# Patient Record
Sex: FEMALE | Race: WHITE | NOT HISPANIC OR LATINO | Employment: FULL TIME | URBAN - METROPOLITAN AREA
[De-identification: names, ages, dates, MRNs, and addresses within clinical notes are randomized per-mention and may not be internally consistent; named-entity substitution may affect disease eponyms.]

---

## 2017-07-18 ENCOUNTER — HOSPITAL ENCOUNTER (EMERGENCY)
Facility: MEDICAL CENTER | Age: 32
End: 2017-07-18
Attending: EMERGENCY MEDICINE
Payer: COMMERCIAL

## 2017-07-18 VITALS
TEMPERATURE: 99.9 F | SYSTOLIC BLOOD PRESSURE: 160 MMHG | RESPIRATION RATE: 18 BRPM | BODY MASS INDEX: 25.83 KG/M2 | WEIGHT: 170.42 LBS | DIASTOLIC BLOOD PRESSURE: 105 MMHG | OXYGEN SATURATION: 100 % | HEIGHT: 68 IN | HEART RATE: 85 BPM

## 2017-07-18 DIAGNOSIS — L02.91 ABSCESS: ICD-10-CM

## 2017-07-18 LAB
APPEARANCE UR: CLEAR
BACTERIA #/AREA URNS HPF: ABNORMAL /HPF
BILIRUB UR QL STRIP.AUTO: NEGATIVE
COLOR UR: YELLOW
CULTURE IF INDICATED INDCX: NO UA CULTURE
EPI CELLS #/AREA URNS HPF: NEGATIVE /HPF
GLUCOSE UR STRIP.AUTO-MCNC: NEGATIVE MG/DL
HYALINE CASTS #/AREA URNS LPF: ABNORMAL /LPF
KETONES UR STRIP.AUTO-MCNC: NEGATIVE MG/DL
LEUKOCYTE ESTERASE UR QL STRIP.AUTO: NEGATIVE
MICRO URNS: ABNORMAL
NITRITE UR QL STRIP.AUTO: NEGATIVE
PH UR STRIP.AUTO: 5.5 [PH]
PROT UR QL STRIP: NEGATIVE MG/DL
RBC # URNS HPF: ABNORMAL /HPF
RBC UR QL AUTO: ABNORMAL
SP GR UR STRIP.AUTO: 1
UROBILINOGEN UR STRIP.AUTO-MCNC: 0.2 MG/DL
WBC #/AREA URNS HPF: ABNORMAL /HPF

## 2017-07-18 PROCEDURE — 81001 URINALYSIS AUTO W/SCOPE: CPT

## 2017-07-18 PROCEDURE — 99284 EMERGENCY DEPT VISIT MOD MDM: CPT

## 2017-07-18 RX ORDER — OXYCODONE HYDROCHLORIDE AND ACETAMINOPHEN 5; 325 MG/1; MG/1
1-2 TABLET ORAL EVERY 4 HOURS PRN
Qty: 30 TAB | Refills: 0 | Status: ON HOLD | OUTPATIENT
Start: 2017-07-18 | End: 2017-07-20

## 2017-07-18 RX ORDER — CIPROFLOXACIN 500 MG/1
500 TABLET, FILM COATED ORAL 2 TIMES DAILY
Qty: 10 TAB | Refills: 0 | Status: SHIPPED | OUTPATIENT
Start: 2017-07-18 | End: 2018-05-14

## 2017-07-18 ASSESSMENT — PAIN SCALES - GENERAL: PAINLEVEL_OUTOF10: 5

## 2017-07-18 ASSESSMENT — LIFESTYLE VARIABLES: DO YOU DRINK ALCOHOL: NO

## 2017-07-18 NOTE — ED NOTES
Pt sent by MD for mass near urethra. Pt reports difficulty urinating. Pt in NAD. VSS. Pt reports mass x 1 month.

## 2017-07-18 NOTE — ED AVS SNAPSHOT
7/18/2017    Rosita Garsia  [address]  [facility] [address]    Dear Rosita:    [facility] wants to ensure your discharge home is safe and you or your loved ones have had all of your questions answered regarding your care after you leave the hospital.    Below is a list of resources and contact information should you have any questions regarding your hospital stay, follow-up instructions, or active medical symptoms.    Questions or Concerns Regarding… Contact   Medical Questions Related to Your Discharge  (7 days a week, 8am-5pm) Contact a Nurse Care Coordinator   [phone]   Medical Questions Not Related to Your Discharge  (24 hours a day / 7 days a week)  Contact the Nurse Health Line   [phone]    Medications or Discharge Instructions Refer to your discharge packet   or contact your [facility] Primary Care Provider   [phone]   Follow-up Appointment(s) Schedule your appointment via All[employer]ils   or contact Scheduling [phone]   Billing Review your statement via AllTrails  or contact Billing [phone]   Medical Records Review your records via AllTrails   or contact Medical Records [phone]     You may receive a telephone call within two days of discharge. This call is to make certain you understand your discharge instructions and have the opportunity to have any questions answered. You can also easily access your medical information, test results and upcoming appointments via the AllTrails free online health management tool. You can learn more and sign up at [email or web address]. For assistance setting up your AllTrails account, please call [phone].    Once again, we want to ensure your discharge home is safe and that you have a clear understanding of any next steps in your care. If you have any questions or concerns, please do not hesitate to contact us, we are here for you. Thank you for choosing [facility] for your healthcare needs.    Sincerely,    Your [facility] Healthcare Team

## 2017-07-18 NOTE — ED PROVIDER NOTES
"ED Provider Note    Scribed for Kenneth Schroeder M.D. by Miguel Galarza. 7/18/2017  12:37 PM    Primary care provider: Pcp Pt States None  Means of arrival: walk-in  History obtained from: patient  History limited by: none    CHIEF COMPLAINT  Chief Complaint   Patient presents with   • Sent by MD PERALTA  Rosita Garsia is a 31 y.o. female who presents to the Emergency Department for evaluation of a chronic worsening cyst located near her urethra, with associated tenderness in that region which she first noticed a year ago. Patient was seen at [facility] just prior to arrival at the ED for evaluation of a small cyst, which she has been able to \"squeeze stuff out of\" until recently. The physician told her to come to the ED immediately to be evaluated. She reports her cyst began small and has quadrupled in size, adding that \"It is painful and angry\".      REVIEW OF SYSTEMS  Pertinent positives include urethral tenderness. Pertinent negatives include none.  E.     PAST MEDICAL HISTORY   has a past medical history of Hypertension.    SURGICAL HISTORY  patient denies any surgical history    SOCIAL HISTORY  Social History   Substance Use Topics   • Smoking status: Never Smoker    • Smokeless tobacco: None   • Alcohol Use: Yes      Comment: \"heavily on weekends\"      History   Drug Use No       FAMILY HISTORY  History reviewed. No pertinent family history.    CURRENT MEDICATIONS  Home Medications     Reviewed by Vanda Sofia R.N. (Registered Nurse) on 07/18/17 at 1211  Med List Status: Partial    Medication Last Dose Status    lisinopril (PRINIVIL) 20 MG TABS  Active    lorazepam (ATIVAN) 1 MG TABS  Active                ALLERGIES  Allergies   Allergen Reactions   • Sulfa Drugs        PHYSICAL EXAM  VITAL SIGNS: /105 mmHg  Pulse 85  Temp(Src) 37.7 °C (99.9 °F)  Resp 18  Ht 1.727 m (5' 7.99\")  Wt 77.3 kg (170 lb 6.7 oz)  BMI 25.92 kg/m2  SpO2 100%    Constitutional: Well developed, Well " nourished, moderate distress, Non-toxic appearance.   HENT: Normocephalic, Atraumatic, Bilateral external ears normal, Oropharynx moist, No oral exudates.   Eyes: PERRLA, EOMI, Conjunctiva normal, No discharge.   Neck: No tenderness, Supple, No stridor.   Lymphatic: No lymphadenopathy noted.   Cardiovascular: Normal heart rate, Normal rhythm.   Thorax & Lungs: Clear to auscultation bilaterally, No respiratory distress, No wheezing, No crackles.   Abdomen: Soft, No tenderness, No masses, No pulsatile masses.   Skin: Warm, Dry, No erythema, No rash.   Extremities:, No edema No cyanosis.   : 1 cm mass-like legion on the posterior aspect of the urethra. Seems to be fluid filled. No erythema. There is tenderness. No above abdominal tenderness.   Musculoskeletal: No tenderness to palpation or major deformities noted.  Intact distal pulses  Neurologic: Awake, alert. Moves all extremities spontaneously.  Psychiatric: Affect normal, Judgment normal, Mood normal.     LABS  Labs Reviewed   URINALYSIS,CULTURE IF INDICATED - Abnormal; Notable for the following:     Occult Blood Moderate (*)     All other components within normal limits   URINE MICROSCOPIC (W/UA) - Abnormal; Notable for the following:     RBC 2-5 (*)     Bacteria Few (*)     All other components within normal limits   All labs reviewed by me.    COURSE & MEDICAL DECISION MAKING  Pertinent Labs & Imaging studies reviewed. (See chart for details)    12:37 PM - Patient seen and examined at bedside. Ordered Urinalysis, Urine microscope to evaluate her symptoms.     12:45 PM Performed a pelvic exam with a female nurse present.     12:54 PM Paged Urology.    12:58 PM - I discussed the patient's case and the above findings with Dr. Carnes (Urology).     2:01 PM Patient reevaluated at bedside. Discussed my consult with Urology with the patient. She is to follow up with Dr. Carnes. If she has any new or worsening symptoms, she is to return to the ED. Patient understands the  plan of care and is agreeable. She agrees to be discharged home.     Decision Making:  Patient with a periurethral cystic structure, Dr. Carnes came and evaluated it, he will likely need to take it to the operating room in the next 1-2 days, but the patient on Cipro, wrote for the patient's pain medicines, will have the patient return with any other concerns.    I reviewed prescription monitoring program for patient's narcotic use before prescribing a scheduled drug.The patient will not drink alcohol nor drive with prescribed medications. The patient will return for new or worsening symptoms and is stable at the time of discharge.    DISPOSITION:  Patient will be discharged home in stable condition.    FOLLOW UP:  [facility], Emergency Dept  [address] [address] [phone]    If symptoms worsen    Marco Carnes M.D.  [address]  [place] [address]  [phone]        OUTPATIENT MEDICATIONS:  Discharge Medication List as of 7/18/2017  2:04 PM      START taking these medications    Details   oxycodone-acetaminophen (PERCOCET) 5-325 MG Tab Take 1-2 Tabs by mouth every four hours as needed for Severe Pain for up to 7 days., Disp-30 Tab, R-0, Print Rx Paper      ciprofloxacin (CIPRO) 500 MG Tab Take 1 Tab by mouth 2 times a day., Disp-10 Tab, R-0, Print Rx Paper           FINAL IMPRESSION  1. Abscess          Miguel GORMAN (Scribe), am scribing for, and in the presence of, Kenneth Schroeder M.D..    Electronically signed by: Miguel Galarza (Evelyn), 7/18/2017    IKenneth M.D. personally performed the services described in this documentation, as scribed by Miguel Galarza in my presence, and it is both accurate and complete.    The note accurately reflects work and decisions made by me.  Kenneth Schroeder  7/18/2017  4:32 PM

## 2017-07-18 NOTE — ED NOTES
Pt sent by MD for mass near urethra, stated doc said urologist are on call today sent here for follow up. Pt reports difficulty urinating. Pt in NAD. VSS. Pt reports mass x 1year.

## 2017-07-18 NOTE — ED NOTES
Pt ambulatory with steady gait, pt verbalized understanding of poc and discharge , no questions at this time.  Family at bedside,   VSS

## 2017-07-18 NOTE — CONSULTS
UROLOGY Consult Note:    Marco Deyvi  Date & Time note created:    7/18/2017   1:49 PM     Referring MD:  Dr. Schroeder    Patient ID:   Name:             Rosita Garsia   YOB: 1985  Age:                 31 y.o.  female   MRN:               [id number]                                                             Reason for Consult:      Urethral lesion.    History of Present Illness:    Abnormal urethral growth for several years. Over the last week it has increased in size and become painful.    Review of Systems:      Constitutional: Denies fevers, Denies weight changes  Eyes: Denies changes in vision, no eye pain  Ears/Nose/Throat/Mouth: Denies nasal congestion or sore throat   Cardiovascular: no chest pain, no palpitations   Respiratory: no shortness of breath , Denies cough  Gastrointestinal/Hepatic: Denies abdominal pain, nausea, vomiting, diarrhea, constipation or GI bleeding   Genitourinary: Denies hematuria, dysuria or frequency, see cc  Musculoskeletal/Rheum: Denies  joint pain and swelling, no edema  Skin: Denies rash  Neurological: Denies headache, confusion, memory loss or focal weakness/parasthesias  Psychiatric: denies mood disorder   Endocrine: Roxana thyroid problems  Heme/Oncology/Lymph Nodes: Denies enlarged lymph nodes, denies brusing or known bleeding disorder  All other systems were reviewed and are negative (AMA/CMS criteria)                Past Medical History:   Past Medical History   Diagnosis Date   • Hypertension      There are no hospital problems to display for this patient.      Past Surgical History:  History reviewed. No pertinent past surgical history.    Hospital Medications:  No current facility-administered medications for this encounter.    Current outpatient prescriptions:   •  lisinopril (PRINIVIL) 20 MG TABS, Take 20 mg by mouth every day., Disp: , Rfl:   •  lorazepam (ATIVAN) 1 MG TABS, Take 1 mg by mouth 2 times a day., Disp: , Rfl:     Current Outpatient  "Medications:    (Not in a hospital admission)    Medication Allergy:  Allergies   Allergen Reactions   • Sulfa Drugs        Family History:  History reviewed. No pertinent family history.    Social History:  Social History     Social History   • Marital Status:      Spouse Name: N/A   • Number of Children: N/A   • Years of Education: N/A     Occupational History   • Not on file.     Social History Main Topics   • Smoking status: Never Smoker    • Smokeless tobacco: Not on file   • Alcohol Use: Yes      Comment: \"heavily on weekends\"   • Drug Use: No   • Sexual Activity: Not on file     Other Topics Concern   • Not on file     Social History Narrative         Physical Exam:  Vitals/ General Appearance:   Weight/BMI: Body mass index is 25.92 kg/(m^2).  Blood pressure 160/105, pulse 85, temperature 37.7 °C (99.9 °F), resp. rate 18, height 1.727 m (5' 7.99\"), weight 77.3 kg (170 lb 6.7 oz), SpO2 100 %.  Filed Vitals:    07/18/17 1147 07/18/17 1149   BP: 160/105    Pulse: 85    Temp: 37.7 °C (99.9 °F)    Resp: 18    Height: 1.727 m (5' 7.99\")    Weight:  77.3 kg (170 lb 6.7 oz)   SpO2: 100%      Oxygen Therapy:  Pulse Oximetry: 100 %, O2 Delivery: None (Room Air)    Constitutional:   Well developed, Well nourished, No acute distress  HENMT:  Normocephalic, Atraumatic, Oropharynx moist mucous membranes, No oral exudates, Nose normal.  No thyromegaly.  Eyes:  EOMI, Conjunctiva normal, No discharge.  Neck:  Normal range of motion, No cervical tenderness,  no JVD.  Cardiovascular:  Normal heart rate, Normal rhythm, No murmurs, No rubs, No gallops.   Extremitites with intact distal pulses, no cyanosis, or edema.  Lungs:  Normal breath sounds, breath sounds clear to auscultation bilaterally,  no crackles, no wheezing.   Abdomen: Bowel sounds normal, Soft, No tenderness, No guarding, No rebound, No masses, No hepatosplenomegaly.  : Normal external genitalia without lesion. 1 cm midline tender growth just proximal to " the urethral meatus. This does not drain with pressure.  Skin: Warm, Dry, No erythema, No rash, no induration.  Neurologic: Alert & oriented x 3, No focal deficits noted, cranial nerves II through X are grossly intact.  Psychiatric: Affect normal, Judgment normal, Mood normal.      MDM (Data Review):     Records reviewed and summarized in current documentation    Lab Data Review:  Recent Results (from the past 24 hour(s))   URINALYSIS,CULTURE IF INDICATED    Collection Time: 07/18/17  1:00 PM   Result Value Ref Range    Color Yellow     Character Clear     Specific Gravity 1.004 <1.035    Ph 5.5 5.0-8.0    Glucose Negative Negative mg/dL    Ketones Negative Negative mg/dL    Protein Negative Negative mg/dL    Bilirubin Negative Negative    Urobilinogen, Urine 0.2 Negative    Nitrite Negative Negative    Leukocyte Esterase Negative Negative    Occult Blood Moderate (A) Negative    Micro Urine Req Microscopic    URINE MICROSCOPIC (W/UA)    Collection Time: 07/18/17  1:00 PM   Result Value Ref Range    WBC 0-2 /hpf    RBC 2-5 (A) /hpf    Bacteria Few (A) None /hpf    Epithelial Cells Negative /hpf    Hyaline Cast 0-2 /lpf       Imaging/Procedures Review:    Reviewed    MDM (Assessment and Plan):   Infected periurethral cyst.   Plan: I&D, will schedule in OR.

## 2017-07-18 NOTE — DISCHARGE INSTRUCTIONS
Please follow-up with your primary care provider for blood pressure management.      Abscess  An abscess (boil or furuncle) is an infected area on or under the skin. This area is filled with yellowish-white fluid (pus) and other material (debris).  HOME CARE   · Only take medicines as told by your doctor.  · If you were given antibiotic medicine, take it as directed. Finish the medicine even if you start to feel better.  · If gauze is used, follow your doctor's directions for changing the gauze.  · To avoid spreading the infection:  ¨ Keep your abscess covered with a bandage.  ¨ Wash your hands well.  ¨ Do not share personal care items, towels, or whirlpools with others.  ¨ Avoid skin contact with others.  · Keep your skin and clothes clean around the abscess.  · Keep all doctor visits as told.  GET HELP RIGHT AWAY IF:   · You have more pain, puffiness (swelling), or redness in the wound site.  · You have more fluid or blood coming from the wound site.  · You have muscle aches, chills, or you feel sick.  · You have a fever.  MAKE SURE YOU:   · Understand these instructions.  · Will watch your condition.  · Will get help right away if you are not doing well or get worse.     This information is not intended to replace advice given to you by your health care provider. Make sure you discuss any questions you have with your health care provider.     Document Released: 06/05/2009 Document Revised: 06/18/2013 Document Reviewed: 03/02/2013  [employer] Interactive Patient Education ©2016 [employer]

## 2017-07-18 NOTE — ED AVS SNAPSHOT
[employer] Access Code: [email or web address]  Expires: 8/17/2017  2:04 PM    Your email address is not on file at [facility].  Email Addresses are required for you to sign up for [employer], please contact [phone] to verify your personal information and to provide your email address prior to attempting to register for [employer].    Rosita Garsia  [address]  [place] [address]    [employer]  A secure, online tool to manage your health information     [facility]’s [employer] Network® is a secure, online tool that connects you to your personalized health information from the privacy of your home -- day or night - making it very easy for you to manage your healthcare. Once the activation process is completed, you can even access your medical information using the [employer] adán, which is available for free in the Apple Adán store or Google Play store.     To learn more about [employer], visit [email or web address]    There are two levels of access available (as shown below):   My Chart Features  [facility] Primary Care Doctor [facility]  Specialists [facility]  Urgent  Care Non-[facility] Primary Care Doctor   Email your healthcare team securely and privately 24/7 X X X    Manage appointments: schedule your next appointment; view details of past/upcoming appointments X      Request prescription refills. X      View recent personal medical records, including lab and immunizations X X X X   View health record, including health history, allergies, medications X X X X   Read reports about your outpatient visits, procedures, consult and ER notes X X X X   See your discharge summary, which is a recap of your hospital and/or ER visit that includes your diagnosis, lab results, and care plan X X  X     How to register for [employer]:  Once your e-mail address has been verified, follow the following steps to sign up for [employer].     1. Go to  [email or web address]  2. Click on the Sign Up Now box, which takes you to the New Member Sign Up page. You  will need to provide the following information:  a. Enter your [employer] Access Code exactly as it appears at the top of this page. (You will not need to use this code after you’ve completed the sign-up process. If you do not sign up before the expiration date, you must request a new code.)   b. Enter your date of birth.   c. Enter your home email address.   d. Click Submit, and follow the next screen’s instructions.  3. Create a [employer] ID. This will be your [employer] login ID and cannot be changed, so think of one that is secure and easy to remember.  4. Create a [employer] password. You can change your password at any time.  5. Enter your Password Reset Question and Answer. This can be used at a later time if you forget your password.   6. Enter your e-mail address. This allows you to receive e-mail notifications when new information is available in [employer].  7. Click Sign Up. You can now view your health information.    For assistance activating your [employer] account, call [phone]

## 2017-07-18 NOTE — ED AVS SNAPSHOT
Home Care Instructions                                                                                                                Rosita Garsia   MRN: [id number]    Department:  [facility], Emergency Dept   Date of Visit:  7/18/2017            [facility], Emergency Dept    [address] [address]    Phone:  [phone]      You were seen by     Kenneth Schroeder M.D.      Your Diagnosis Was     Abscess     L02.91       Follow-up Information     1. Follow up with [facility], Emergency Dept.    Specialty:  Emergency Medicine    Why:  If symptoms worsen    Contact information    [address] [address] [phone]        2. Follow up with Marco Carnes M.D..    Specialty:  Urology    Contact information    [address]  [address]  [facility] [address] [phone]        Medication Information     Review all of your home medications and newly ordered medications with your primary doctor and/or pharmacist as soon as possible. Follow medication instructions as directed by your doctor and/or pharmacist.     Please keep your complete medication list with you and share with your physician. Update the information when medications are discontinued, doses are changed, or new medications (including over-the-counter products) are added; and carry medication information at all times in the event of emergency situations.               Medication List      START taking these medications        Instructions    Morning Afternoon Evening Bedtime    ciprofloxacin 500 MG Tabs   Commonly known as:  CIPRO        Take 1 Tab by mouth 2 times a day.   Dose:  500 mg                        oxycodone-acetaminophen 5-325 MG Tabs   Commonly known as:  PERCOCET        Take 1-2 Tabs by mouth every four hours as needed for Severe Pain for up to 7 days.   Dose:  1-2 Tab                          CONTINUE taking these medications        Instructions    Morning Afternoon Evening Bedtime    lisinopril 20 MG Tabs   Commonly known as:  PRINIVIL        Take 20 mg by mouth every day.   Dose:  20 mg                        lorazepam 1 MG Tabs   Commonly known as:  ATIVAN        Take 1 mg by mouth 2 times a day.   Dose:  1 mg                             Where to Get Your Medications      You can get these medications from any pharmacy     Bring a paper prescription for each of these medications    - ciprofloxacin 500 MG Tabs  - oxycodone-acetaminophen 5-325 MG Tabs            Procedures and tests performed during your visit     DISCHARGE NURSING COMMUNICATION    DISCHARGE PATIENT    URINALYSIS,CULTURE IF INDICATED    URINE MICROSCOPIC (W/UA)        Discharge Instructions       Please follow-up with your primary care provider for blood pressure management.      Abscess  An abscess (boil or furuncle) is an infected area on or under the skin. This area is filled with yellowish-white fluid (pus) and other material (debris).  HOME CARE   · Only take medicines as told by your doctor.  · If you were given antibiotic medicine, take it as directed. Finish the medicine even if you start to feel better.  · If gauze is used, follow your doctor's directions for changing the gauze.  · To avoid spreading the infection:  ¨ Keep your abscess covered with a bandage.  ¨ Wash your hands well.  ¨ Do not share personal care items, towels, or whirlpools with others.  ¨ Avoid skin contact with others.  · Keep your skin and clothes clean around the abscess.  · Keep all doctor visits as told.  GET HELP RIGHT AWAY IF:   · You have more pain, puffiness (swelling), or redness in the wound site.  · You have more fluid or blood coming from the wound site.  · You have muscle aches, chills, or you feel sick.  · You have a fever.  MAKE SURE YOU:   · Understand these instructions.  · Will watch your condition.  · Will get help right away if you are not doing well or get worse.     This information is not  intended to replace advice given to you by your health care provider. Make sure you discuss any questions you have with your health care provider.     Document Released: 06/05/2009 Document Revised: 06/18/2013 Document Reviewed: 03/02/2013  Elsevier Interactive Patient Education ©2016 [employer]vier Inc.            Patient Information     Patient Information    Following emergency treatment: all patient requiring follow-up care must return either to a private physician or a clinic if your condition worsens before you are able to obtain further medical attention, please return to the emergency room.     Billing Information    At [facility], we work to make the billing process streamlined for our patients.  Our Representatives are here to answer any questions you may have regarding your hospital bill.  If you have insurance coverage and have supplied your insurance information to us, we will submit a claim to your insurer on your behalf.  Should you have any questions regarding your bill, we can be reached online or by phone as follows:  Online: You are able pay your bills online or live chat with our representatives about any billing questions you may have. We are here to help Monday - Friday from 8:00am to 7:30pm and 9:00am - 12:00pm on Saturdays.  Please visit [email or web address]  for more information.   Phone:  [phone] or [phone]    Please note that your emergency physician, surgeon, pathologist, radiologist, anesthesiologist, and other specialists are not employed by [facility] and will therefore bill separately for their services.  Please contact them directly for any questions concerning their bills at the numbers below:     Emergency Physician Services:  [phone]  [facility]:  [phone]  [facility]:  [phone]  [facility]:  [phone]    1. Your final bill may vary from the amount quoted upon discharge if all  procedures are not complete at that time, or if your doctor has additional procedures of which we are not aware. You will receive an additional bill if you return to the Emergency Department at [facility] for suture removal regardless of the facility of which the sutures were placed.     2. Please arrange for settlement of this account at the emergency registration.    3. All self-pay accounts are due in full at the time of treatment.  If you are unable to meet this obligation then payment is expected within 4-5 days.     4. If you have had radiology studies (CT, X-ray, Ultrasound, MRI), you have received a preliminary result during your emergency department visit. Please contact the radiology department [phone] to receive a copy of your final result. Please discuss the Final result with your primary physician or with the follow up physician provided.     Crisis Hotline:  [place] Crisis Hotline:  1-884-MEORHGM or 1-317.703.8028  [place] Crisis Hotline:    1-343.552.4870 or 170-209-5386         ED Discharge Follow Up Questions    1. In order to provide you with very good care, we would like to follow up with a phone call in the next few days.  May we have your permission to contact you?     YES /  NO    2. What is the best phone number to call you? (       )_____-__________    3. What is the best time to call you?      Morning  /  Afternoon  /  Evening                   Patient Signature:  ____________________________________________________________    Date:  ____________________________________________________________

## 2017-07-20 ENCOUNTER — APPOINTMENT (OUTPATIENT)
Dept: RADIOLOGY | Facility: MEDICAL CENTER | Age: 32
End: 2017-07-20
Attending: UROLOGY
Payer: COMMERCIAL

## 2017-07-20 ENCOUNTER — HOSPITAL ENCOUNTER (OUTPATIENT)
Facility: MEDICAL CENTER | Age: 32
End: 2017-07-20
Attending: UROLOGY | Admitting: UROLOGY
Payer: COMMERCIAL

## 2017-07-20 VITALS
BODY MASS INDEX: 25.93 KG/M2 | RESPIRATION RATE: 19 BRPM | HEART RATE: 66 BPM | TEMPERATURE: 98.5 F | OXYGEN SATURATION: 97 % | WEIGHT: 171.08 LBS | HEIGHT: 68 IN

## 2017-07-20 PROBLEM — O34.40: Status: ACTIVE | Noted: 2017-07-20

## 2017-07-20 PROBLEM — D49.59: Status: ACTIVE | Noted: 2017-07-20

## 2017-07-20 LAB
EKG IMPRESSION: NORMAL
GRAM STN SPEC: NORMAL
HCG UR QL: NEGATIVE
SIGNIFICANT IND 70042: NORMAL
SITE SITE: NORMAL
SOURCE SOURCE: NORMAL
SP GR UR REFRACTOMETRY: 1.02

## 2017-07-20 PROCEDURE — 160047 HCHG PACU  - EA ADDL 30 MINS PHASE II: Performed by: UROLOGY

## 2017-07-20 PROCEDURE — 700102 HCHG RX REV CODE 250 W/ 637 OVERRIDE(OP)

## 2017-07-20 PROCEDURE — 87070 CULTURE OTHR SPECIMN AEROBIC: CPT

## 2017-07-20 PROCEDURE — 160048 HCHG OR STATISTICAL LEVEL 1-5: Performed by: UROLOGY

## 2017-07-20 PROCEDURE — 700111 HCHG RX REV CODE 636 W/ 250 OVERRIDE (IP)

## 2017-07-20 PROCEDURE — 87205 SMEAR GRAM STAIN: CPT

## 2017-07-20 PROCEDURE — A9270 NON-COVERED ITEM OR SERVICE: HCPCS

## 2017-07-20 PROCEDURE — 93005 ELECTROCARDIOGRAM TRACING: CPT | Performed by: UROLOGY

## 2017-07-20 PROCEDURE — A6407 PACKING STRIPS, NON-IMPREG: HCPCS | Performed by: UROLOGY

## 2017-07-20 PROCEDURE — 160009 HCHG ANES TIME/MIN: Performed by: UROLOGY

## 2017-07-20 PROCEDURE — 160046 HCHG PACU - 1ST 60 MINS PHASE II: Performed by: UROLOGY

## 2017-07-20 PROCEDURE — 700101 HCHG RX REV CODE 250

## 2017-07-20 PROCEDURE — 160036 HCHG PACU - EA ADDL 30 MINS PHASE I: Performed by: UROLOGY

## 2017-07-20 PROCEDURE — 500892 HCHG PACK, PERI-GYN: Performed by: UROLOGY

## 2017-07-20 PROCEDURE — 501329 HCHG SET, CYSTO IRRIG Y TUR: Performed by: UROLOGY

## 2017-07-20 PROCEDURE — 160002 HCHG RECOVERY MINUTES (STAT): Performed by: UROLOGY

## 2017-07-20 PROCEDURE — 160025 RECOVERY II MINUTES (STATS): Performed by: UROLOGY

## 2017-07-20 PROCEDURE — 502240 HCHG MISC OR SUPPLY RC 0272: Performed by: UROLOGY

## 2017-07-20 PROCEDURE — 93010 ELECTROCARDIOGRAM REPORT: CPT | Performed by: INTERNAL MEDICINE

## 2017-07-20 PROCEDURE — 81025 URINE PREGNANCY TEST: CPT

## 2017-07-20 PROCEDURE — 500042 HCHG BAG, URINARY DRAINAGE (CLOSED): Performed by: UROLOGY

## 2017-07-20 PROCEDURE — 87075 CULTR BACTERIA EXCEPT BLOOD: CPT

## 2017-07-20 PROCEDURE — 160028 HCHG SURGERY MINUTES - 1ST 30 MINS LEVEL 3: Performed by: UROLOGY

## 2017-07-20 PROCEDURE — 160035 HCHG PACU - 1ST 60 MINS PHASE I: Performed by: UROLOGY

## 2017-07-20 PROCEDURE — A4357 BEDSIDE DRAINAGE BAG: HCPCS | Performed by: UROLOGY

## 2017-07-20 PROCEDURE — 500880 HCHG PACK, CYSTO W/SEP LEGGINGS: Performed by: UROLOGY

## 2017-07-20 RX ORDER — LIDOCAINE HYDROCHLORIDE 10 MG/ML
INJECTION, SOLUTION INFILTRATION; PERINEURAL
Status: DISCONTINUED
Start: 2017-07-20 | End: 2017-07-20 | Stop reason: HOSPADM

## 2017-07-20 RX ORDER — LIDOCAINE HYDROCHLORIDE AND EPINEPHRINE 5; 5 MG/ML; UG/ML
INJECTION, SOLUTION INFILTRATION; PERINEURAL
Status: DISCONTINUED | OUTPATIENT
Start: 2017-07-20 | End: 2017-07-20 | Stop reason: HOSPADM

## 2017-07-20 RX ORDER — GUAIFENESIN/EPHEDRINE HCL 200-12.5MG
1 TABLET ORAL DAILY
COMMUNITY
End: 2022-12-16

## 2017-07-20 RX ORDER — LIDOCAINE AND PRILOCAINE 25; 25 MG/G; MG/G
1 CREAM TOPICAL
Status: DISCONTINUED | OUTPATIENT
Start: 2017-07-20 | End: 2017-07-20 | Stop reason: HOSPADM

## 2017-07-20 RX ORDER — LIDOCAINE HYDROCHLORIDE 10 MG/ML
0.5 INJECTION, SOLUTION INFILTRATION; PERINEURAL
Status: DISCONTINUED | OUTPATIENT
Start: 2017-07-20 | End: 2017-07-20 | Stop reason: HOSPADM

## 2017-07-20 RX ORDER — SODIUM CHLORIDE, SODIUM LACTATE, POTASSIUM CHLORIDE, CALCIUM CHLORIDE 600; 310; 30; 20 MG/100ML; MG/100ML; MG/100ML; MG/100ML
INJECTION, SOLUTION INTRAVENOUS CONTINUOUS
Status: DISCONTINUED | OUTPATIENT
Start: 2017-07-20 | End: 2017-07-20 | Stop reason: HOSPADM

## 2017-07-20 RX ADMIN — HYDROCODONE BITARTRATE AND ACETAMINOPHEN 15 ML: 2.5; 108 SOLUTION ORAL at 08:53

## 2017-07-20 ASSESSMENT — PAIN SCALES - GENERAL
PAINLEVEL_OUTOF10: 0
PAINLEVEL_OUTOF10: 0
PAINLEVEL_OUTOF10: 4
PAINLEVEL_OUTOF10: 0

## 2017-07-20 NOTE — OP REPORT
Preoperative diagnosis: Infected periurethral cyst  Postoperative diagnosis: Same  Procedure performed:  1. Cystoscopy urethroscopy  2. Incision and drainage of periurethral abscess.   Anesthesia: Dr. Aly Sanderson, general anesthetic    Procedure in detail:  The patient is brought into the operating room transferred from [facility] to the OR table. She is then given an excellent general anesthetic by Dr. Hurd. She is placed into the dorsal lithotomy position prepped and draped in the usual fashion. A timeout is then done to confirm patient, procedure, review patient allergies and review of preoperative antibiotics. Once timeout was completed and agreed upon the case started.    On examination there is a mass that is approximately 2 cm across. It is in the midline just below the urethral meatus. Pressing on the mass fluid is pushed into the urethra. I then took a 21 Albanian cystoscope with an introducing obturator. It was lubricated and white balanced. It was then passed through the urethra and into the urinary bladder. The urinary bladder shows no tumorsstones nor diverticula. The ureteral orifices her in orthotopic position bilaterally. A have a clear urine effluxed bilaterally. The scope was then drawn back into the urethra. Urethra was carefully examined. I am looking for evidence of a urethral diverticulum. None can be seen.    I then took a syringe and injected the skin over the mass with 1% lidocaine with epinephrine. As soon as I passed a needle into this mass it started draining purulent fluid. This fluid was cultured for both aerobes and anaerobic bacteria. I then took a 15 blade and incised  the mucosa over the mass. This immediately drained 1-2 mL of purulent fluid. I then irrigated the abscess cavity. I then packed the abscess cavity with iodoform gauze. After that I reinspected the urethra again I could find no evidence of any connection between the urethra and this abscess.    The prep is then cleaned off the  patient. She's taken out of the lithotomy position. She is sent transferred to a gurney and back to the recovery area in stable condition.    Estimated blood loss: Less than 2 mL  Specimen: abscess fluid for culture  Drains: wound packing with iodoform gauze  Complications: None

## 2017-07-20 NOTE — OR NURSING
Report called to Suzi JOHNSON. Plan of care discussed. Patient denies pain or nausea at this time. VSS. No acute s/s of distress noted. Patient awake, alert and oriented.

## 2017-07-20 NOTE — IP AVS SNAPSHOT
" Home Care Instructions                                                                                                                Name:Rosita Garsia  Medical Record Number:[id number]  CSN: [id number]    YOB: 1985   Age: 31 y.o.  Sex: female  HT:1.727 m (5' 8\") WT: 77.6 kg (171 lb 1.2 oz)          Admit Date: 7/20/2017     Discharge Date:   Today's Date: 7/20/2017  Attending Doctor:  Marco Carnes M.D.                  Allergies:  Percocet and Sulfa drugs                Discharge Instructions         ACTIVITY: Rest and take it easy for the first 24 hours.  A responsible adult is recommended to remain with you during that time.  It is normal to feel sleepy.  We encourage you to not do anything that requires balance, judgment or coordination.    MILD FLU-LIKE SYMPTOMS ARE NORMAL. YOU MAY EXPERIENCE GENERALIZED MUSCLE ACHES, THROAT IRRITATION, HEADACHE AND/OR SOME NAUSEA.    FOR 24 HOURS DO NOT:  Drive, operate machinery or run household appliances.  Drink beer or alcoholic beverages.   Make important decisions or sign legal documents.    SPECIAL INSTRUCTIONS: leave packing in place until appointment with Dr Carnes tomorrow morning    DIET: To avoid nausea, slowly advance diet as tolerated, avoiding spicy or greasy foods for the first day.  Add more substantial food to your diet according to your physician's instructions.  Babies can be fed formula or breast milk as soon as they are hungry.  INCREASE FLUIDS AND FIBER TO AVOID CONSTIPATION.    SURGICAL DRESSING/BATHING: Keep area dry, return to office in AM to remove packing    FOLLOW-UP APPOINTMENT:  A follow-up appointment should be arranged with your doctor in 7/21/17 at 0830; call to schedule.    You should CALL YOUR PHYSICIAN if you develop:  Fever greater than 101 degrees F.  Pain not relieved by medication, or persistent nausea or vomiting.  Excessive bleeding (blood soaking through dressing) or unexpected drainage from the wound.  Extreme redness " or swelling around the incision site, drainage of pus or foul smelling drainage.  Inability to urinate or empty your bladder within 8 hours.  Problems with breathing or chest pain.    You should call 911 if you develop problems with breathing or chest pain.  If you are unable to contact your doctor or surgical center, you should go to the nearest emergency room or urgent care center.  Physician's telephone #: [phone]    If any questions arise, call your doctor.  If your doctor is not available, please feel free to call the Surgical Center.  The Center is open Monday through Friday from 7AM to 7PM.  You can also call the [employer] HOTLINE open 24 hours/day, 7 days/week and speak to a nurse at [phone], or toll free at [phone].  A registered nurse may call you a few days after your surgery to see how you are doing after your procedure.    MEDICATIONS: Resume taking daily medication.  Take prescribed pain medication with food.  If no medication is prescribed, you may take non-aspirin pain medication if needed.  PAIN MEDICATION CAN BE VERY CONSTIPATING.  Take a stool softener or laxative such as senokot, pericolace, or milk of magnesia if needed.    Prescription given for continue taking home prescription for percocet.  Last pain medication given at 0853.    If your physician has prescribed pain medication that includes Acetaminophen (Tylenol), do not take additional Acetaminophen (Tylenol) while taking the prescribed medication.    Depression / Suicide Risk    As you are discharged from this [facility] facility, it is important to learn how to keep safe from harming yourself.    Recognize the warning signs:  · Abrupt changes in personality, positive or negative- including increase in energy   · Giving away possessions  · Change in eating patterns- significant weight changes-  positive or negative  · Change in sleeping patterns- unable to sleep or sleeping all the time   · Unwillingness or inability to  communicate  · Depression  · Unusual sadness, discouragement and loneliness  · Talk of wanting to die  · Neglect of personal appearance   · Rebelliousness- reckless behavior  · Withdrawal from people/activities they love  · Confusion- inability to concentrate     If you or a loved one observes any of these behaviors or has concerns about self-harm, here's what you can do:  · Talk about it- your feelings and reasons for harming yourself  · Remove any means that you might use to hurt yourself (examples: pills, rope, extension cords, firearm)  · Get professional help from the community (Mental Health, Substance Abuse, psychological counseling)  · Do not be alone:Call your Safe Contact- someone whom you trust who will be there for you.  · Call your local CRISIS HOTLINE 233-4152 or 599-327-0747  · Call your local Children's Mobile Crisis Response Team [place] (386) 072-3545 or www.[employer]  · Call the toll free National Suicide Prevention Hotlines   · National Suicide Prevention Lifeline 321-899-NBZH (3871)  · National Hope Line Network 800-SUICIDE (189-2765)       Medication List      CONTINUE taking these medications        Instructions    Morning Afternoon Evening Bedtime    5- MG Caps        Take 1 Cap by mouth every day.   Dose:  1 Cap                        ciprofloxacin 500 MG Tabs   Commonly known as:  CIPRO        Take 1 Tab by mouth 2 times a day.   Dose:  500 mg                        lisinopril 20 MG Tabs   Commonly known as:  PRINIVIL        Take 10 mg by mouth every day.   Dose:  10 mg                        MELATONIN PO        Take 1 Can by mouth every bedtime.   Dose:  1 Can                        MIRENA (52 MG) 20 MCG/24HR IUD   Generic drug:  levonorgestrel        1 Each by Intrauterine route Once.   Dose:  1 Each                          ASK your doctor about these medications        Instructions    Morning Afternoon Evening Bedtime    multivitamin Tabs        Take 1 Tab by mouth  every day.   Dose:  1 Tab                                Medication Information     Above and/or attached are the medications your physician expects you to take upon discharge. Review all of your home medications and newly ordered medications with your doctor and/or pharmacist. Follow medication instructions as directed by your doctor and/or pharmacist. Please keep your medication list with you and share with your physician. Update the information when medications are discontinued, doses are changed, or new medications (including over-the-counter products) are added; and carry medication information at all times in the event of emergency situations.        Resources     Quit Smoking / Tobacco Use:    I understand the use of any tobacco products increases my chance of suffering from future heart disease or stroke and could cause other illnesses which may shorten my life. Quitting the use of tobacco products is the single most important thing I can do to improve my health. For further information on smoking / tobacco cessation call a Toll Free Quit Line at 1-913.675.4553 (*[employer] [place]) or 1-599.901.8394 (American Lung Association) or you can access the web based program at www.lungFiz.org.    [place] Tobacco Users Help Line:  (743) 645-8530       Toll Free: 1-816.933.5727  Quit Tobacco Program [facility] (220)163-7844    Crisis Hotline:    [place] Crisis Hotline:  8-030-CYFYJMI or 1-237.725.8609    [place] Crisis Hotline:    1-309.819.5287 or 242-133-6033    Discharge Survey:   Thank you for choosing [facility]. We hope we did everything we could to make your hospital stay a pleasant one. You may be receiving a survey and we would appreciate your time and participation in answering the questions. Your input is very valuable to us in our efforts to improve our service to our patients and their families.            Signatures     My signature on this form indicates that:    1. I  acknowledge receipt and understanding of these Home Care Instruction.  2. My questions regarding this information have been answered to my satisfaction.  3. I have formulated a plan with my discharge nurse to obtain my prescribed medications for home.    __________________________________      __________________________________                   Patient Signature                                 Guardian/Responsible Adult Signature      __________________________________                 __________       ________                       Nurse Signature                                               Date                 Time

## 2017-07-20 NOTE — IP AVS SNAPSHOT
7/20/2017    Rosita Garsia  [address]  [facility] [address]    Dear Rosita:    [facility] wants to ensure your discharge home is safe and you or your loved ones have had all of your questions answered regarding your care after you leave the hospital.    Below is a list of resources and contact information should you have any questions regarding your hospital stay, follow-up instructions, or active medical symptoms.    Questions or Concerns Regarding… Contact   Medical Questions Related to Your Discharge  (7 days a week, 8am-5pm) Contact a Nurse Care Coordinator   [phone]   Medical Questions Not Related to Your Discharge  (24 hours a day / 7 days a week)  Contact the Nurse Health Line   [phone]    Medications or Discharge Instructions Refer to your discharge packet   or contact your [facility] Primary Care Provider   [phone]   Follow-up Appointment(s) Schedule your appointment via [employer]   or contact Scheduling [phone]   Billing Review your statement via [employer]  or contact Billing [phone]   Medical Records Review your records via [employer]   or contact Medical Records [phone]     You may receive a telephone call within two days of discharge. This call is to make certain you understand your discharge instructions and have the opportunity to have any questions answered. You can also easily access your medical information, test results and upcoming appointments via the [employer] free online health management tool. You can learn more and sign up at [employer]. For assistance setting up your [employer] account, please call [phone].    Once again, we want to ensure your discharge home is safe and that you have a clear understanding of any next steps in your care. If you have any questions or concerns, please do not hesitate to contact us, we are here for you. Thank you for choosing [facility] for your healthcare needs.    Sincerely,    Your [facility] Healthcare Team

## 2017-07-20 NOTE — PROGRESS NOTES
Med rec complete per Pt at bedside  Pt states Percocet makes her hallucinate   Requesting Norco in stead  Allergies reviewed  No ABX in last 30 days

## 2017-07-20 NOTE — DISCHARGE INSTRUCTIONS
ACTIVITY: Rest and take it easy for the first 24 hours.  A responsible adult is recommended to remain with you during that time.  It is normal to feel sleepy.  We encourage you to not do anything that requires balance, judgment or coordination.    MILD FLU-LIKE SYMPTOMS ARE NORMAL. YOU MAY EXPERIENCE GENERALIZED MUSCLE ACHES, THROAT IRRITATION, HEADACHE AND/OR SOME NAUSEA.    FOR 24 HOURS DO NOT:  Drive, operate machinery or run household appliances.  Drink beer or alcoholic beverages.   Make important decisions or sign legal documents.    SPECIAL INSTRUCTIONS: leave packing in place until appointment with Dr Carnes tomorrow morning    DIET: To avoid nausea, slowly advance diet as tolerated, avoiding spicy or greasy foods for the first day.  Add more substantial food to your diet according to your physician's instructions.  Babies can be fed formula or breast milk as soon as they are hungry.  INCREASE FLUIDS AND FIBER TO AVOID CONSTIPATION.    SURGICAL DRESSING/BATHING: Keep area dry, return to office in AM to remove packing    FOLLOW-UP APPOINTMENT:  A follow-up appointment should be arranged with your doctor in 7/21/17 at 0830; call to schedule.    You should CALL YOUR PHYSICIAN if you develop:  Fever greater than 101 degrees F.  Pain not relieved by medication, or persistent nausea or vomiting.  Excessive bleeding (blood soaking through dressing) or unexpected drainage from the wound.  Extreme redness or swelling around the incision site, drainage of pus or foul smelling drainage.  Inability to urinate or empty your bladder within 8 hours.  Problems with breathing or chest pain.    You should call 911 if you develop problems with breathing or chest pain.  If you are unable to contact your doctor or surgical center, you should go to the nearest emergency room or urgent care center.  Physician's telephone #: [phone]    If any questions arise, call your doctor.  If your doctor is not available, please feel free  to call the Surgical Center.  The Center is open Monday through Friday from 7AM to 7PM.  You can also call the HEALTH HOTLINE open 24 hours/day, 7 days/week and speak to a nurse at [phone], or toll free at [phone].  A registered nurse may call you a few days after your surgery to see how you are doing after your procedure.    MEDICATIONS: Resume taking daily medication.  Take prescribed pain medication with food.  If no medication is prescribed, you may take non-aspirin pain medication if needed.  PAIN MEDICATION CAN BE VERY CONSTIPATING.  Take a stool softener or laxative such as senokot, pericolace, or milk of magnesia if needed.    Prescription given for continue taking home prescription for percocet.  Last pain medication given at 0853.    If your physician has prescribed pain medication that includes Acetaminophen (Tylenol), do not take additional Acetaminophen (Tylenol) while taking the prescribed medication.    Depression / Suicide Risk    As you are discharged from this [facility] facility, it is important to learn how to keep safe from harming yourself.    Recognize the warning signs:  · Abrupt changes in personality, positive or negative- including increase in energy   · Giving away possessions  · Change in eating patterns- significant weight changes-  positive or negative  · Change in sleeping patterns- unable to sleep or sleeping all the time   · Unwillingness or inability to communicate  · Depression  · Unusual sadness, discouragement and loneliness  · Talk of wanting to die  · Neglect of personal appearance   · Rebelliousness- reckless behavior  · Withdrawal from people/activities they love  · Confusion- inability to concentrate     If you or a loved one observes any of these behaviors or has concerns about self-harm, here's what you can do:  · Talk about it- your feelings and reasons for harming yourself  · Remove any means that you might use to hurt yourself (examples: pills, rope,  extension cords, firearm)  · Get professional help from the community (Mental Health, Substance Abuse, psychological counseling)  · Do not be alone:Call your Safe Contact- someone whom you trust who will be there for you.  · Call your local CRISIS HOTLINE 263-0103 or 986-208-1784  · Call your local Children's Mobile Crisis Response Team [place] (364) 210-1465 or www.Numerate  · Call the toll free National Suicide Prevention Hotlines   · National Suicide Prevention Lifeline 015-125-QJPE (7466)  · National Hope Line Network 800-SUICIDE (994-0445)

## 2017-07-22 LAB
BACTERIA WND AEROBE CULT: ABNORMAL
BACTERIA WND AEROBE CULT: ABNORMAL
GRAM STN SPEC: ABNORMAL
SIGNIFICANT IND 70042: ABNORMAL
SITE SITE: ABNORMAL
SOURCE SOURCE: ABNORMAL

## 2017-07-25 LAB
BACTERIA SPEC ANAEROBE CULT: ABNORMAL
BACTERIA SPEC ANAEROBE CULT: ABNORMAL
SIGNIFICANT IND 70042: ABNORMAL
SITE SITE: ABNORMAL
SOURCE SOURCE: ABNORMAL

## 2022-12-16 ENCOUNTER — OFFICE VISIT (OUTPATIENT)
Dept: URGENT CARE | Facility: PHYSICIAN GROUP | Age: 37
End: 2022-12-16
Payer: COMMERCIAL

## 2022-12-16 VITALS
HEIGHT: 68 IN | SYSTOLIC BLOOD PRESSURE: 128 MMHG | DIASTOLIC BLOOD PRESSURE: 86 MMHG | BODY MASS INDEX: 26.25 KG/M2 | HEART RATE: 70 BPM | WEIGHT: 173.2 LBS | RESPIRATION RATE: 18 BRPM | TEMPERATURE: 98.6 F | OXYGEN SATURATION: 99 %

## 2022-12-16 DIAGNOSIS — S05.02XA ABRASION OF LEFT CORNEA, INITIAL ENCOUNTER: ICD-10-CM

## 2022-12-16 PROCEDURE — 99203 OFFICE O/P NEW LOW 30 MIN: CPT | Performed by: NURSE PRACTITIONER

## 2022-12-16 RX ORDER — POLYMYXIN B SULFATE AND TRIMETHOPRIM 1; 10000 MG/ML; [USP'U]/ML
1 SOLUTION OPHTHALMIC EVERY 4 HOURS
Qty: 10 ML | Refills: 0 | Status: SHIPPED | OUTPATIENT
Start: 2022-12-16 | End: 2022-12-23

## 2022-12-16 NOTE — LETTER
December 16, 2022       Patient: Rosita Garsia   YOB: 1985   Date of Visit: 12/16/2022         To Whom It May Concern:    In my medical opinion, I recommend that Rosita Garsia return to full duty, no restrictions 12/17/22              Sincerely,          FRANCES QuezadaPMUKESH.  Electronically Signed

## 2022-12-17 NOTE — PROGRESS NOTES
Rosita Garsia is a 37 y.o. female who presents for Eye Problem (Right eye pain/burning sensation/swollen/itching/xtoday)      HPI  This is a new problem. Rosita Garsia is a 37 y.o. patient who presents to urgent care with c/o: Left eye pain and burning sensation.  She cleaned her cat litter box last night and changed the cat litter from a course going to a fine grain of sand.  It aerosolized up and she thinks some of it might of gotten her eye.  She has had pain and redness in her eye all day.  [facility] and they flushed her eye out.  They then told her that she had to come to urgent care to be seen.  Treatments tried: Eye flushing  Denies fever, chills, vision change, headache, blurred vision  No other aggravating or alleviating factors.       ROS See HPI    Allergies:       Allergies   Allergen Reactions    Celebrex [Celecoxib] Swelling    Sulfa Drugs Swelling       PMSFS Hx:  Past Medical History:   Diagnosis Date    Anxiety     Hypertension     Migraine      Past Surgical History:   Procedure Laterality Date    TRIGGER FINGER RELEASE Right 5/15/2018    Procedure: TRIGGER FINGER RELEASE;  Surgeon: Valentin Camargo M.D.;  Location: SURGERY [facility]S;  Service: Orthopedics    CYSTOSCOPY  7/20/2017    Procedure: CYSTOSCOPY FOR INCISION AND DRAINAGE OF URETHRAL GROWTH;  Surgeon: Marco Carnes M.D.;  Location: SURGERY [facility] ORS;  Service:     OTHER      Tonsilectomy     History reviewed. No pertinent family history.  Social History     Tobacco Use    Smoking status: Never    Smokeless tobacco: Never   Substance Use Topics    Alcohol use: No       Problems:   Patient Active Problem List   Diagnosis    Tumor of cervix affecting pregnancy       Medications:   Current Outpatient Medications on File Prior to Visit   Medication Sig Dispense Refill    multivitamin (THERAGRAN) Tab Take 1 Tab by mouth every day.      levonorgestrel (MIRENA) 52 mg (20 mcg/24 hr) IUD 1  "Each by Intrauterine route Once.      lisinopril (PRINIVIL) 20 MG TABS Take 5 mg by mouth every day.       No current facility-administered medications on file prior to visit.          Objective:     /86 (BP Location: Right arm, Patient Position: Sitting)   Pulse 70   Temp 37 °C (98.6 °F) (Temporal)   Resp 18   Ht 1.727 m (5' 8\")   Wt 78.6 kg (173 lb 3.2 oz)   LMP 12/16/2022 (Exact Date)   SpO2 99%   BMI 26.33 kg/m²     Physical Exam  Vitals and nursing note reviewed.   Constitutional:       General: She is not in acute distress.     Appearance: Normal appearance. She is well-developed and well-groomed.   HENT:      Head: Normocephalic.   Eyes:      General: Lids are normal.      Pupils: Pupils are equal, round, and reactive to light.      Left eye: Corneal abrasion and fluorescein uptake present.      Slit lamp exam:     Left eye: No foreign body.     Cardiovascular:      Rate and Rhythm: Normal rate.      Pulses: Normal pulses.   Pulmonary:      Effort: Pulmonary effort is normal.   Musculoskeletal:      Cervical back: Full passive range of motion without pain and normal range of motion.   Skin:     General: Skin is warm and dry.      Capillary Refill: Capillary refill takes less than 2 seconds.   Neurological:      Mental Status: She is alert and oriented to person, place, and time.   Psychiatric:         Mood and Affect: Mood normal.         Speech: Speech normal.         Behavior: Behavior normal. Behavior is cooperative.         Thought Content: Thought content normal.         Assessment /Associated Orders:      1. Abrasion of left cornea, initial encounter  polymixin-trimethoprim (POLYTRIM) 86597-5.1 UNIT/ML-% Solution          Medical Decision Making:    Pt is clinically stable at today's acute urgent care visit.  No acute distress noted. Appropriate for outpatient care at this time.   Acute problem today .   Warm or cool compresses as needed  OTC  analgesic of choice (acetaminophen or NSAID) " prn pain. Follow manufactures dosing and safety precautions.   Liquid tears prn Dosage and directions per   Keep well hydrated  Note for work excuse.     Discussed Dx, management options (risks,benefits, and alternatives to planned treatment), natural progression and supportive care.  Expressed understanding and the treatment plan was agreed upon.   Questions were encouraged and answered   Return to urgent care prn if new or worsening sx or if there is no improvement in condition prn.    Educated in Red flags and indications to immediately call 911 or present to the Emergency Department.         Please note that this dictation was created using voice recognition software. I have worked with consultants from the vendor as well as technical experts from [employer] to optimize the interface. I have made every reasonable attempt to correct obvious errors, but I expect that there are errors of grammar and possibly content that I did not discover before finalizing the note.  This note was electronically signed by provider

## (undated) DEVICE — SPONGE GAUZESTER 4 X 4 4PLY - (128PK/CA)

## (undated) DEVICE — HEAD HOLDER JUNIOR/ADULT

## (undated) DEVICE — ELECTRODE 850 FOAM ADHESIVE - HYDROGEL RADIOTRNSPRNT (50/PK)

## (undated) DEVICE — GOWN SURGEONS X-LARGE - DISP. (30/CA)

## (undated) DEVICE — CANISTER SUCTION 3000ML MECHANICAL FILTER AUTO SHUTOFF MEDI-VAC NONSTERILE LF DISP  (40EA/CA)

## (undated) DEVICE — CONNECTOR HOSE NEPTUNE FOR CYSTO ROOM

## (undated) DEVICE — GLOVE BIOGEL SZ 6.5 SURGICAL PF LTX (50PR/BX 4BX/CA)

## (undated) DEVICE — TUBE, CULTURE AEROBIC

## (undated) DEVICE — ELECTRODE DUAL RETURN W/ CORD - (50/PK)

## (undated) DEVICE — MASK, LARYNGEAL AIRWAY #4

## (undated) DEVICE — STRIP PACKING STERILE 1/4 IN - 1/4 IN X 5 YDS (IODOFORM)

## (undated) DEVICE — JELLY, KY 2 0Z STERILE

## (undated) DEVICE — SET IRRIGATION CYSTOSCOPY Y-TYPE L81 IN (20EA/CA)

## (undated) DEVICE — SWAB ANAEROBIC SPEC.COLLECTOR - (25/PK 4PK/CA 100EA/CA)

## (undated) DEVICE — BAG DRAINAGE URINARY CLOSED 2000ML (20EA/CA)

## (undated) DEVICE — NEPTUNE 4 PORT MANIFOLD - (20/PK)

## (undated) DEVICE — SET LEADWIRE 5 LEAD BEDSIDE DISPOSABLE ECG (1SET OF 5/EA)

## (undated) DEVICE — MASK ANESTHESIA ADULT  - (100/CA)

## (undated) DEVICE — COVER FOOT UNIVERSAL DISP. - (25EA/CA)

## (undated) DEVICE — SUCTION INSTRUMENT YANKAUER BULBOUS TIP W/O VENT (50EA/CA)

## (undated) DEVICE — BAG URODRAIN WITH TUBING - (20/CA)

## (undated) DEVICE — LACTATED RINGERS INJ 1000 ML - (14EA/CA 60CA/PF)

## (undated) DEVICE — GLOVE BIOGEL PI INDICATOR SZ 7.0 SURGICAL PF LF - (50/BX 4BX/CA)

## (undated) DEVICE — GLOVE BIOGEL PI INDICATOR SZ 6.5 SURGICAL PF LF - (50/BX 4BX/CA)

## (undated) DEVICE — SLEEVE, VASO, THIGH, MED

## (undated) DEVICE — SET EXTENSION WITH 2 PORTS (48EA/CA) ***PART #2C8610 IS A SUBSTITUTE*****

## (undated) DEVICE — LEAD SET 6 DISP. EKG NIHON KOHDEN

## (undated) DEVICE — PACK CYSTOSCOPY - (14/CA)

## (undated) DEVICE — DETERGENT RENUZYME PLUS 10 OZ PACKET (50/BX)

## (undated) DEVICE — TUBING CLEARLINK DUO-VENT - C-FLO (48EA/CA)

## (undated) DEVICE — BLADE SURGICAL #15 - (50/BX 3BX/CA)

## (undated) DEVICE — Device

## (undated) DEVICE — KIT ROOM DECONTAMINATION

## (undated) DEVICE — GLOVE BIOGEL SZ 7.5 SURGICAL PF LTX - (50PR/BX 4BX/CA)

## (undated) DEVICE — SENSOR SPO2 NEO LNCS ADHESIVE (20/BX) SEE USER NOTES

## (undated) DEVICE — PROTECTOR ULNA NERVE - (36PR/CA)

## (undated) DEVICE — TUBE CONNECT SUCTION CLEAR 120 X 1/4" (50EA/CA)"

## (undated) DEVICE — KIT ANESTHESIA W/CIRCUIT & 3/LT BAG W/FILTER (20EA/CA)